# Patient Record
Sex: MALE | Race: OTHER | HISPANIC OR LATINO | Employment: FULL TIME | URBAN - METROPOLITAN AREA
[De-identification: names, ages, dates, MRNs, and addresses within clinical notes are randomized per-mention and may not be internally consistent; named-entity substitution may affect disease eponyms.]

---

## 2022-01-20 ENCOUNTER — TELEPHONE (OUTPATIENT)
Dept: SCHEDULING | Facility: IMAGING CENTER | Age: 32
End: 2022-01-20

## 2022-01-21 ENCOUNTER — OFFICE VISIT (OUTPATIENT)
Dept: MEDICAL GROUP | Facility: PHYSICIAN GROUP | Age: 32
End: 2022-01-21
Payer: COMMERCIAL

## 2022-01-21 VITALS
BODY MASS INDEX: 35.16 KG/M2 | TEMPERATURE: 97.8 F | WEIGHT: 224 LBS | OXYGEN SATURATION: 96 % | SYSTOLIC BLOOD PRESSURE: 110 MMHG | DIASTOLIC BLOOD PRESSURE: 72 MMHG | HEIGHT: 67 IN | HEART RATE: 90 BPM | RESPIRATION RATE: 18 BRPM

## 2022-01-21 DIAGNOSIS — K92.1 BLOOD IN STOOL: ICD-10-CM

## 2022-01-21 DIAGNOSIS — Z00.00 ENCOUNTER FOR MEDICAL EXAMINATION TO ESTABLISH CARE: ICD-10-CM

## 2022-01-21 DIAGNOSIS — J45.20 MILD INTERMITTENT ASTHMA WITHOUT COMPLICATION: ICD-10-CM

## 2022-01-21 PROBLEM — J45.909 ASTHMA: Status: ACTIVE | Noted: 2022-01-21

## 2022-01-21 PROCEDURE — 99203 OFFICE O/P NEW LOW 30 MIN: CPT

## 2022-01-21 ASSESSMENT — PATIENT HEALTH QUESTIONNAIRE - PHQ9: CLINICAL INTERPRETATION OF PHQ2 SCORE: 0

## 2022-01-22 NOTE — ASSESSMENT & PLAN NOTE
Patient reports that he was 24 years old he first had an episode of bright red blood in his stool for about 1 to 2 days.  It resolved on its own and he did not think anything of it.  It has been happening intermittently throughout the years.  However, he notices that for the last 2 years or so the episodes have been happening more frequently.  He reports that before it happens he will feel bloated or hungry, and then began passing bright red blood.  The stool does not have blood in it, but more blood around it.  He reports that the toilet will look like somebody put a red dye dropper in the water.  He reports that these episodes last about a week before they resolve.  There is no pain when the episodes happen.  He has no problems with constipation he denies melena or black stools.

## 2022-01-22 NOTE — PROGRESS NOTES
CC:    Chief Complaint   Patient presents with   • Establish Care   • Other     blood in stool        HISTORY OF THE PRESENT ILLNESS: This is a pleasant 31 y.o. male presenting today to [facility], formerly [facility], who wishes to discuss the following problems listed below:     Blood in stool  Patient reports that he was 24 years old he first had an episode of bright red blood in his stool for about 1 to 2 days.  It resolved on its own and he did not think anything of it.  It has been happening intermittently throughout the years.  However, he notices that for the last 2 years or so the episodes have been happening more frequently.  He reports that before it happens he will feel bloated or hungry, and then began passing bright red blood.  The stool does not have blood in it, but more blood around it.  He reports that the toilet will look like somebody put a red dye dropper in the water.  He reports that these episodes last about a week before they resolve.  There is no pain when the episodes happen.  He has no problems with constipation he denies melena or black stools.     Asthma  Patient reports that when he was a child he had severe asthma, requiring multiple hospitalizations.  He reports that when he was in high school he sort of outgrew his asthma and has had no further issues.     Allergies: Penicillins    No current outpatient medications on file.     No current facility-administered medications for this visit.       ROS:   Constitutional: Patient denies any fever, chills, night sweats, weight loss/gain, fatigue, or malaise.   Eyes: Patient denies any photophobia, eye pain, diplopia, discharge, dryness, excessive tearing, redness, or VA changes.   ENT: Patient denies any runny nose, hoarseness, sore throat, or dysphagia.   Resp: Patient denies cough, wheezing, or shortness of breath.   CV: Patient denies any chest pain, claudication, cyanosis, palpitations, or edema.   GI: Patient denies any constipation, nausea, vomiting,  "diarrhea, bloating, abdominal pain, or dyspepsia.   MSK: Patient denies any joint pain, cramps, swelling, weakness, stiffness, or tremor  Skin: Patient denies any color changes, skin changes, lesions, ulcerations, wounds, and pruritus, hair or nail changes.   Neuro: Patient denies any headache, numbness or tingling  Psych: Patient denies any suicidal or homicidal ideation, depression or anxiety.     Exam: /72 (BP Location: Right arm, Patient Position: Sitting, BP Cuff Size: Large adult)   Pulse 90   Temp 36.6 °C (97.8 °F) (Temporal)   Resp 18   Ht 1.702 m (5' 7\")   Wt 102 kg (224 lb)   SpO2 96%  Body mass index is 35.08 kg/m².    Gen: Alert and oriented x4. Well developed, well-nourished male in no apparent distress.  Skin: Warm, dry, good turgor, no rashes in visible areas or lacerations appreciated.   Eye: EOM intact, pupils equal, round and reactive, conjunctiva clear, lids normal.  Neck: Trachea midline, no masses, no thyromegaly  Lungs: Normal effort, CTA bilaterally, no wheezes, rhonchi, or rales. No stridor or audible wheezing. Equal chest expansion.   CV: Regular rate and rhythm. No murmurs, rubs, or gallops.  GI:  Soft, non-tender abdomen with no distention.   MSK: Normal gait, moves all extremities.  Neuro: Alert and oriented x 4, non-focal exam with motor and sensory grossly intact.  Ext: No clubbing, cyanosis, edema.  Psych: Normal behavior, affect and mood.      Assessment/Plan:    31 y.o. male with the followin. Blood in stool  Chronic condition, worsening.  Discussed with patient that his symptoms do not sound like a classic hemorrhoid as there is no pain or constipation present.  I am suspicious that he may have a polyp and would recommend a colonoscopy.  Referral to GI placed today.  CBC to check hemoglobin due to persistent GI bleeding.  - Referral to GI for Colonoscopy  - CBC WITHOUT DIFFERENTIAL; Future    2. Encounter for medical examination to establish care  - Comp " Metabolic Panel; Future  - Lipid Profile; Future  - TSH WITH REFLEX TO FT4; Future  - VITAMIN D,25 HYDROXY; Future    3. Mild intermittent asthma without complication  Resolved issue.  Discussed with patient that asthmatic symptoms can return at any point and would recommend that he contact me should he feel that he is experiencing any shortness of breath or increased sensitivities to environmental triggers such as smoke, etc.  Patient verbalized understanding      Follow-up: Return in about 1 year (around 1/21/2023), or if symptoms worsen or fail to improve.    Health Maintenance: Completed    I have placed the below orders and discussed them with an approved delegating provider.  The MA is performing the below orders under the direction of Alba Small MD.    Please note that this dictation was created using voice recognition software. I have made every reasonable attempt to correct obvious errors, but I expect that there are errors of grammar and possibly content that I did not discover before finalizing the note.    Electronically signed by MARIMAR Thibodeaux on January 21, 2022

## 2022-01-22 NOTE — ASSESSMENT & PLAN NOTE
Patient reports that when he was a child he had severe asthma, requiring multiple hospitalizations.  He reports that when he was in high school he sort of outgrew his asthma and has had no further issues.

## 2022-09-26 ENCOUNTER — APPOINTMENT (RX ONLY)
Dept: URBAN - METROPOLITAN AREA CLINIC 22 | Facility: CLINIC | Age: 32
Setting detail: DERMATOLOGY
End: 2022-09-26

## 2022-09-26 DIAGNOSIS — B07.8 OTHER VIRAL WARTS: ICD-10-CM

## 2022-09-26 PROCEDURE — ? PRESCRIPTION SAMPLES PROVIDED

## 2022-09-26 PROCEDURE — ? LIQUID NITROGEN

## 2022-09-26 PROCEDURE — ? COUNSELING

## 2022-09-26 PROCEDURE — 17110 DESTRUCTION B9 LES UP TO 14: CPT

## 2022-09-26 ASSESSMENT — LOCATION DETAILED DESCRIPTION DERM
LOCATION DETAILED: LEFT PROXIMAL RADIAL DORSAL FOREARM
LOCATION DETAILED: LEFT PROXIMAL DORSAL FOREARM
LOCATION DETAILED: LEFT DISTAL DORSAL FOREARM

## 2022-09-26 ASSESSMENT — LOCATION ZONE DERM: LOCATION ZONE: ARM

## 2022-09-26 ASSESSMENT — LOCATION SIMPLE DESCRIPTION DERM: LOCATION SIMPLE: LEFT FOREARM

## 2022-09-26 NOTE — HPI: SKIN LESION
Is This A New Presentation, Or A Follow-Up?: Skin Lesions
What Type Of Note Output Would You Prefer (Optional)?: Bullet Format
How Severe Is Your Skin Lesion?: mild
Has Your Skin Lesion Been Treated?: not been treated
Additional History: Has used otc salicylic acid to sites.  He reports that the areas flattened out but did resolve

## 2022-09-26 NOTE — PROCEDURE: LIQUID NITROGEN
Consent: The patient's mom’s consent was obtained including but not limited to risks of crusting, scabbing, blistering, scarring, darker or lighter pigmentary change, recurrence, incomplete removal and infection.
Medical Necessity Information: It is in your best interest to select a reason for this procedure from the list below. All of these items fulfill various CMS LCD requirements except the new and changing color options.
Show Topical Anesthesia Variable?: Yes
Spray Paint Technique: No
Spray Paint Text: The liquid nitrogen was applied to the skin utilizing a spray paint frosting technique.
Medical Necessity Clause: This procedure was medically necessary because the lesions that were treated were:
Detail Level: Detailed
Post-Care Instructions: I reviewed with the patient in detail post-care instructions. Patient is to wear sunprotection, and avoid picking at any of the treated lesions. Pt may apply Vaseline to crusted or scabbing areas.

## 2022-10-31 ENCOUNTER — APPOINTMENT (RX ONLY)
Dept: URBAN - METROPOLITAN AREA CLINIC 22 | Facility: CLINIC | Age: 32
Setting detail: DERMATOLOGY
End: 2022-10-31

## 2022-10-31 DIAGNOSIS — B07.8 OTHER VIRAL WARTS: ICD-10-CM | Status: IMPROVED

## 2022-10-31 PROCEDURE — ? LIQUID NITROGEN

## 2022-10-31 PROCEDURE — ? ADDITIONAL NOTES

## 2022-10-31 PROCEDURE — 17110 DESTRUCTION B9 LES UP TO 14: CPT

## 2022-10-31 PROCEDURE — ? COUNSELING

## 2022-10-31 ASSESSMENT — LOCATION DETAILED DESCRIPTION DERM
LOCATION DETAILED: LEFT DISTAL ULNAR DORSAL FOREARM
LOCATION DETAILED: LEFT PROXIMAL DORSAL FOREARM

## 2022-10-31 ASSESSMENT — LOCATION SIMPLE DESCRIPTION DERM: LOCATION SIMPLE: LEFT FOREARM

## 2022-10-31 ASSESSMENT — LOCATION ZONE DERM: LOCATION ZONE: ARM

## 2022-10-31 NOTE — PROCEDURE: ADDITIONAL NOTES
Render Risk Assessment In Note?: no
Additional Notes: LN2 worked well for lesions,all nearly resolved.  Additional to two residual today.
Detail Level: Simple

## 2022-10-31 NOTE — PROCEDURE: LIQUID NITROGEN
Spray Paint Text: The liquid nitrogen was applied to the skin utilizing a spray paint frosting technique.
Show Applicator Variable?: Yes
Post-Care Instructions: I reviewed with the patient in detail post-care instructions. Patient is to wear sunprotection, and avoid picking at any of the treated lesions. Pt may apply Vaseline to crusted or scabbing areas.
Include Z78.9 (Other Specified Conditions Influencing Health Status) As An Associated Diagnosis?: No
Detail Level: Detailed
Consent: The patient's mom’s consent was obtained including but not limited to risks of crusting, scabbing, blistering, scarring, darker or lighter pigmentary change, recurrence, incomplete removal and infection.
Medical Necessity Clause: This procedure was medically necessary because the lesions that were treated were:
Medical Necessity Information: It is in your best interest to select a reason for this procedure from the list below. All of these items fulfill various CMS LCD requirements except the new and changing color options.

## 2023-01-26 ENCOUNTER — PATIENT MESSAGE (OUTPATIENT)
Dept: MEDICAL GROUP | Facility: PHYSICIAN GROUP | Age: 33
End: 2023-01-26
Payer: COMMERCIAL

## 2023-01-26 DIAGNOSIS — J45.20 MILD INTERMITTENT ASTHMA WITHOUT COMPLICATION: ICD-10-CM

## 2023-01-31 RX ORDER — ALBUTEROL SULFATE 90 UG/1
2 AEROSOL, METERED RESPIRATORY (INHALATION) EVERY 4 HOURS PRN
Qty: 1 EACH | Refills: 0 | Status: SHIPPED | OUTPATIENT
Start: 2023-01-31

## 2023-02-07 ENCOUNTER — OFFICE VISIT (OUTPATIENT)
Dept: MEDICAL GROUP | Facility: PHYSICIAN GROUP | Age: 33
End: 2023-02-07
Payer: COMMERCIAL

## 2023-02-07 VITALS
HEIGHT: 67 IN | TEMPERATURE: 97.7 F | HEART RATE: 71 BPM | SYSTOLIC BLOOD PRESSURE: 122 MMHG | BODY MASS INDEX: 35.94 KG/M2 | DIASTOLIC BLOOD PRESSURE: 70 MMHG | RESPIRATION RATE: 18 BRPM | OXYGEN SATURATION: 97 % | WEIGHT: 229 LBS

## 2023-02-07 DIAGNOSIS — J45.20 MILD INTERMITTENT ASTHMA WITHOUT COMPLICATION: ICD-10-CM

## 2023-02-07 DIAGNOSIS — Z23 NEED FOR VACCINATION: ICD-10-CM

## 2023-02-07 PROCEDURE — 90471 IMMUNIZATION ADMIN: CPT

## 2023-02-07 PROCEDURE — 90472 IMMUNIZATION ADMIN EACH ADD: CPT

## 2023-02-07 PROCEDURE — 99214 OFFICE O/P EST MOD 30 MIN: CPT | Mod: 25

## 2023-02-07 PROCEDURE — 90715 TDAP VACCINE 7 YRS/> IM: CPT

## 2023-02-07 PROCEDURE — 90677 PCV20 VACCINE IM: CPT

## 2023-02-07 RX ORDER — CETIRIZINE HYDROCHLORIDE 10 MG/1
10 TABLET ORAL DAILY
Qty: 30 TABLET | Refills: 2 | Status: SHIPPED | OUTPATIENT
Start: 2023-02-07

## 2023-02-07 ASSESSMENT — PATIENT HEALTH QUESTIONNAIRE - PHQ9: CLINICAL INTERPRETATION OF PHQ2 SCORE: 0

## 2023-02-07 NOTE — PROGRESS NOTES
"CC:   Chief Complaint   Patient presents with    Shortness of Breath     2 weeks        HISTORY OF PRESENT ILLNESS: Patient is a 32 y.o. male established patient who presents today to discuss the following problems below:     Asthma  Patient reports that at night and in the mornings he gets a sensation of some mucus around the lining of his trachea that causes a whistling sensation. He reports if feels like a film. He gets shortness of breath during those episodes. He has been using his albuterol inhaler which loosens the mucus and he is able to cough this up. Before he had the inhaler he was taking mucinex, which was also helpful. He has been using his albuterol every evening for the last two weeks.     Review of Systems: Otherwise negative except for as stated above.      Exam: /70   Pulse 71   Temp 36.5 °C (97.7 °F) (Temporal)   Resp 18   Ht 1.702 m (5' 7\")   Wt 104 kg (229 lb)   SpO2 97%  Body mass index is 35.87 kg/m².      Physical Exam  Constitutional:       Appearance: Normal appearance.   Cardiovascular:      Rate and Rhythm: Normal rate and regular rhythm.      Heart sounds: Normal heart sounds.   Pulmonary:      Effort: Pulmonary effort is normal. No tachypnea, accessory muscle usage or prolonged expiration.      Breath sounds: Normal breath sounds. No wheezing.   Musculoskeletal:      Cervical back: Normal range of motion and neck supple.   Lymphadenopathy:      Cervical: No cervical adenopathy.   Neurological:      General: No focal deficit present.      Mental Status: He is alert and oriented to person, place, and time.     Assessment/Plan:  32 y.o. male with the following -    1. Need for vaccination  - Pneumococcal Conjugate Vaccine 20-Valent (19 yrs+)  - Tdap Vaccine =>8YO IM    2. Mild intermittent asthma without complication  Chronic, not at goal.  Discussed with patient that I do not necessarily think that his asthma is worsening, but I do think that his allergies are worsening and " causing significant postnasal drip that is likely flaring up his asthma symptoms.  Plan is to get baseline PFTs and start on daily Zyrtec.  Discussed with patient that goal is to reduce albuterol use to 1-2 times per month for adequate control.  I would like to see him back in 4 weeks for reevaluation  - PULMONARY FUNCTION TESTS -Test requested: Spirometry with-out & with Bronchodilator; Future  - cetirizine (ZYRTEC) 10 MG Tab; Take 1 Tablet by mouth every day.  Dispense: 30 Tablet; Refill: 2       Follow-up: Return in about 4 weeks (around 3/7/2023) for asthma .    Health Maintenance: Completed      Please note that this dictation was created using voice recognition software. I have made every reasonable attempt to correct obvious errors, but I expect that there are errors of grammar and possibly content that I did not discover before finalizing the note.    Electronically signed by MARIMAR Thibodeaux on February 7, 2023

## 2023-02-07 NOTE — ASSESSMENT & PLAN NOTE
Patient reports that at night and in the mornings he gets a sensation of some mucus around the lining of his trachea that causes a whistling sensation. He reports if feels like a film. He gets shortness of breath during those episodes. He has been using his albuterol inhaler which loosens the mucus and he is able to cough this up. Before he had the inhaler he was taking mucinex, which was also helpful. He has been using his albuterol every evening for the last two weeks.

## 2023-03-07 ENCOUNTER — OFFICE VISIT (OUTPATIENT)
Dept: MEDICAL GROUP | Facility: PHYSICIAN GROUP | Age: 33
End: 2023-03-07
Payer: COMMERCIAL

## 2023-03-07 VITALS
TEMPERATURE: 98.2 F | DIASTOLIC BLOOD PRESSURE: 72 MMHG | OXYGEN SATURATION: 96 % | WEIGHT: 232.7 LBS | BODY MASS INDEX: 36.52 KG/M2 | HEART RATE: 80 BPM | HEIGHT: 67 IN | SYSTOLIC BLOOD PRESSURE: 122 MMHG

## 2023-03-07 DIAGNOSIS — J45.20 MILD INTERMITTENT ASTHMA WITHOUT COMPLICATION: ICD-10-CM

## 2023-03-07 PROCEDURE — 99213 OFFICE O/P EST LOW 20 MIN: CPT

## 2023-03-07 NOTE — ASSESSMENT & PLAN NOTE
Last visit, patient was recommended to start on daily Zyrtec 10 mg 2 time to get better control of his allergies in an effort to better improve his asthma symptoms. He reports drastic improvement in his overall symptoms and only is using his albuterol 180mcg per puff, 2 puffs 1-2 times per month.  He is pleased overall, does notice a significant difference in his symptoms if he does not take his Zyrtec

## 2023-03-07 NOTE — PROGRESS NOTES
"CC:   Chief Complaint   Patient presents with    Follow-Up     Asthma           HISTORY OF PRESENT ILLNESS: Patient is a 32 y.o. male established patient who presents today to discuss the following problems below:     Asthma  Last visit, patient was recommended to start on daily Zyrtec 10 mg 2 time to get better control of his allergies in an effort to better improve his asthma symptoms. He reports drastic improvement in his overall symptoms and only is using his albuterol 180mcg per puff, 2 puffs 1-2 times per month.  He is pleased overall, does notice a significant difference in his symptoms if he does not take his Zyrtec    Review of Systems: Otherwise negative except for as stated above.      Exam: /72 (BP Location: Left arm, Patient Position: Sitting, BP Cuff Size: Large adult)   Pulse 80   Temp 36.8 °C (98.2 °F) (Temporal)   Ht 1.702 m (5' 7\")   Wt 106 kg (232 lb 11.2 oz)   SpO2 96%  Body mass index is 36.45 kg/m².    Physical Exam  Constitutional:       Appearance: Normal appearance.   Cardiovascular:      Rate and Rhythm: Normal rate and regular rhythm.      Heart sounds: Normal heart sounds.   Pulmonary:      Effort: Pulmonary effort is normal.      Breath sounds: Normal breath sounds.   Musculoskeletal:      Cervical back: Normal range of motion and neck supple.   Lymphadenopathy:      Cervical: No cervical adenopathy.   Neurological:      General: No focal deficit present.      Mental Status: He is alert and oriented to person, place, and time.       Assessment/Plan:  32 y.o. male with the following -    1. Mild intermittent asthma without complication  Chronic, stable.  Patient has improved significantly by adding in Zyrtec 10 mg daily to manage underlying allergies associated with his asthma.  He is now using his albuterol inhaler 1-2 times per month, and usually only on days when he forgets his Zyrtec.  We did discuss what to expect regarding seasonal allergies, that he may be able to only " take Zyrtec during particular seasons when he is experiencing worsening shortness of breath or asthma symptoms, where he may need to be on this medication for daily.  Discussed with patient that we can always consider referral to an allergist should his symptoms worsen or become unstable.  For now, continue albuterol 108 mcg per actuation, 2 puffs every 4 hours as needed, with goal of use 1-2 times per month.    Follow-up: Return in about 1 year (around 3/7/2024) for AWV.    Health Maintenance: Completed      Please note that this dictation was created using voice recognition software. I have made every reasonable attempt to correct obvious errors, but I expect that there are errors of grammar and possibly content that I did not discover before finalizing the note.    Electronically signed by MARIMAR Thibodeaux on March 7, 2023